# Patient Record
Sex: MALE | Race: OTHER | HISPANIC OR LATINO | URBAN - METROPOLITAN AREA
[De-identification: names, ages, dates, MRNs, and addresses within clinical notes are randomized per-mention and may not be internally consistent; named-entity substitution may affect disease eponyms.]

---

## 2020-04-11 ENCOUNTER — EMERGENCY (EMERGENCY)
Facility: HOSPITAL | Age: 55
LOS: 1 days | Discharge: HOME | End: 2020-04-11
Attending: STUDENT IN AN ORGANIZED HEALTH CARE EDUCATION/TRAINING PROGRAM
Payer: MEDICAID

## 2020-04-11 VITALS
RESPIRATION RATE: 19 BRPM | DIASTOLIC BLOOD PRESSURE: 89 MMHG | HEIGHT: 67 IN | HEART RATE: 102 BPM | WEIGHT: 190.04 LBS | OXYGEN SATURATION: 95 % | TEMPERATURE: 101 F | SYSTOLIC BLOOD PRESSURE: 185 MMHG

## 2020-04-11 VITALS — RESPIRATION RATE: 20 BRPM | OXYGEN SATURATION: 95 % | TEMPERATURE: 99 F | HEART RATE: 86 BPM

## 2020-04-11 PROCEDURE — 99284 EMERGENCY DEPT VISIT MOD MDM: CPT

## 2020-04-11 PROCEDURE — 71045 X-RAY EXAM CHEST 1 VIEW: CPT | Mod: 26

## 2020-04-11 PROCEDURE — 99283 EMERGENCY DEPT VISIT LOW MDM: CPT

## 2020-04-11 PROCEDURE — 71045 X-RAY EXAM CHEST 1 VIEW: CPT

## 2020-04-11 RX ORDER — AMLODIPINE BESYLATE 2.5 MG/1
0 TABLET ORAL
Qty: 0 | Refills: 0 | DISCHARGE

## 2020-04-11 RX ORDER — METFORMIN HYDROCHLORIDE 850 MG/1
0 TABLET ORAL
Qty: 0 | Refills: 0 | DISCHARGE

## 2020-04-11 RX ORDER — ACETAMINOPHEN 500 MG
650 TABLET ORAL ONCE
Refills: 0 | Status: COMPLETED | OUTPATIENT
Start: 2020-04-11 | End: 2020-04-11

## 2020-04-11 RX ADMIN — Medication 650 MILLIGRAM(S): at 10:51

## 2020-04-11 NOTE — ED PROVIDER NOTE - PROGRESS NOTE DETAILS
Dahlia PGY2- cxr/presentation with covid, spo2 93% on RA with ambulation,  advise quarantine and tylenol, stable for dc

## 2020-04-11 NOTE — ED PROVIDER NOTE - PHYSICAL EXAMINATION
PHYSICAL EXAM:  GENERAL: NAD, well-groomed, well-developed, vitals reassuring   HEAD:  Atraumatic, Normocephalic  EYES: EOMI, PERRLA, conjunctiva and sclera clear  ENMT: normal phonation, tolerating secretions   NECK: Supple, No JVD,  HEART: Regular rate and rhythm; No murmurs, rubs, or gallops  RESPIRATORY: no resp distress, spo2 93 on RA, speaking full sentences  ABDOMEN: Soft, Nontender, Nondistended  BACK: normal ROM  NEURO: A&Ox3, nonfocal, moving all extremities, normal speech/memory/gait   EXTREMITIES:  2+ Peripheral Pulses, No clubbing, cyanosis, or edema  SKIN: warm, dry, normal color, no rash

## 2020-04-11 NOTE — ED PROVIDER NOTE - PATIENT PORTAL LINK FT
You can access the FollowMyHealth Patient Portal offered by [facility] by registering at the following website: [email or web address]. By joining [employer]s FollowMyHealth portal, you will also be able to view your health information using other applications (apps) compatible with our system.

## 2020-04-11 NOTE — ED PROVIDER NOTE - ATTENDING CONTRIBUTION TO CARE
54 YOM PMH DM, HTN with 2 weeks of fever, cough and sob. Denies chills, cp, n/v, abd pain, leg swelling, back pain. Non smoker and denies lung disease. No sick contacts or high risk travel.   Ap - nontoxic appearing, ambulatory saturation 93% w/o tachypnea. viral syndrome, possible COVID. likely dc with quarantine instruction and return precautions

## 2020-04-11 NOTE — ED PROVIDER NOTE - OBJECTIVE STATEMENT
54 yoM, NIDDM, HTN 2 weeks of fever/cough/sob. Non smoker. No lung disease. No travel or covid contacts. No hx of DVT/PE. Not yet evaluated for sx. Last Tylenol last night. No chest/abd/back pain. No GI sx.

## 2020-04-11 NOTE — ED ADULT NURSE REASSESSMENT NOTE - NS ED NURSE REASSESS COMMENT FT1
pt ambulated with pulse ox on pt only wemt down to 93% on room air and went right back up to 96% on room air when he stopped ambulating Dr Reveles notified pt pending cxray

## 2020-04-11 NOTE — ED ADULT NURSE NOTE - OBJECTIVE STATEMENT
pt 55 yo male hx htn DM presents with over 1 week fevers and some sob pt denies any pain at this time skin warm dry pt laced on isolation on arrival no chest pain temp 101.2 on arrival last took tylenol last night pt oriented to room pt pending md evaluation and further orders pt with bilateral clear breath sounds ascultated

## 2020-04-11 NOTE — ED PROVIDER NOTE - NSFOLLOWUPINSTRUCTIONS_ED_ALL_ED_FT
Es probable que tenga el virus COVID.    Lela tylenol y mantente hidratado.    Practica la auto cuarentena.    Regrese a la sera de emergencias por síntomas nuevos o preocupantes.

## 2020-04-11 NOTE — ED PROVIDER NOTE - CLINICAL SUMMARY MEDICAL DECISION MAKING FREE TEXT BOX
Dahlia PGY2- 54 yoM, htn, dm, 2 weeks of covid sx, fever/cough/dyspnea, non smoker  looks well, spo2 93% with ambulation, no resp distress  likely covid, treat symptotically, no hypoxia  stable for d/c

## 2020-04-11 NOTE — ED PROVIDER NOTE - MUSCULOSKELETAL NEGATIVE STATEMENT, MLM
+ toleration, mild oral dysphagia
mild oral dysphagia with no overt s/s of aspiration vs penetration
no back pain, no gout, no musculoskeletal pain, no neck pain, and no weakness.